# Patient Record
Sex: FEMALE | Race: WHITE | NOT HISPANIC OR LATINO
[De-identification: names, ages, dates, MRNs, and addresses within clinical notes are randomized per-mention and may not be internally consistent; named-entity substitution may affect disease eponyms.]

---

## 2019-08-23 PROBLEM — Z00.00 ENCOUNTER FOR PREVENTIVE HEALTH EXAMINATION: Status: ACTIVE | Noted: 2019-08-23

## 2019-12-19 ENCOUNTER — ASOB RESULT (OUTPATIENT)
Age: 29
End: 2019-12-19

## 2019-12-19 ENCOUNTER — APPOINTMENT (OUTPATIENT)
Dept: ANTEPARTUM | Facility: CLINIC | Age: 29
End: 2019-12-19
Payer: COMMERCIAL

## 2019-12-19 PROCEDURE — 76811 OB US DETAILED SNGL FETUS: CPT

## 2020-04-20 ENCOUNTER — INPATIENT (INPATIENT)
Facility: HOSPITAL | Age: 30
LOS: 3 days | Discharge: HOME | End: 2020-04-24
Attending: OBSTETRICS & GYNECOLOGY | Admitting: OBSTETRICS & GYNECOLOGY
Payer: COMMERCIAL

## 2020-04-20 VITALS
HEART RATE: 106 BPM | TEMPERATURE: 99 F | DIASTOLIC BLOOD PRESSURE: 71 MMHG | SYSTOLIC BLOOD PRESSURE: 112 MMHG | RESPIRATION RATE: 18 BRPM

## 2020-04-20 LAB
APPEARANCE UR: CLEAR — SIGNIFICANT CHANGE UP
BASOPHILS # BLD AUTO: 0.01 K/UL — SIGNIFICANT CHANGE UP (ref 0–0.2)
BASOPHILS NFR BLD AUTO: 0.1 % — SIGNIFICANT CHANGE UP (ref 0–1)
BILIRUB UR-MCNC: NEGATIVE — SIGNIFICANT CHANGE UP
BLD GP AB SCN SERPL QL: SIGNIFICANT CHANGE UP
COLOR SPEC: YELLOW — SIGNIFICANT CHANGE UP
DIFF PNL FLD: NEGATIVE — SIGNIFICANT CHANGE UP
EOSINOPHIL # BLD AUTO: 0.01 K/UL — SIGNIFICANT CHANGE UP (ref 0–0.7)
EOSINOPHIL NFR BLD AUTO: 0.1 % — SIGNIFICANT CHANGE UP (ref 0–8)
GLUCOSE UR QL: NEGATIVE — SIGNIFICANT CHANGE UP
HCT VFR BLD CALC: 37.9 % — SIGNIFICANT CHANGE UP (ref 37–47)
HGB BLD-MCNC: 13.1 G/DL — SIGNIFICANT CHANGE UP (ref 12–16)
IMM GRANULOCYTES NFR BLD AUTO: 0.4 % — HIGH (ref 0.1–0.3)
KETONES UR-MCNC: SIGNIFICANT CHANGE UP
LEUKOCYTE ESTERASE UR-ACNC: NEGATIVE — SIGNIFICANT CHANGE UP
LYMPHOCYTES # BLD AUTO: 0.86 K/UL — LOW (ref 1.2–3.4)
LYMPHOCYTES # BLD AUTO: 7.6 % — LOW (ref 20.5–51.1)
MCHC RBC-ENTMCNC: 32 PG — HIGH (ref 27–31)
MCHC RBC-ENTMCNC: 34.6 G/DL — SIGNIFICANT CHANGE UP (ref 32–37)
MCV RBC AUTO: 92.4 FL — SIGNIFICANT CHANGE UP (ref 81–99)
MONOCYTES # BLD AUTO: 0.52 K/UL — SIGNIFICANT CHANGE UP (ref 0.1–0.6)
MONOCYTES NFR BLD AUTO: 4.6 % — SIGNIFICANT CHANGE UP (ref 1.7–9.3)
NEUTROPHILS # BLD AUTO: 9.83 K/UL — HIGH (ref 1.4–6.5)
NEUTROPHILS NFR BLD AUTO: 87.2 % — HIGH (ref 42.2–75.2)
NITRITE UR-MCNC: NEGATIVE — SIGNIFICANT CHANGE UP
NRBC # BLD: 0 /100 WBCS — SIGNIFICANT CHANGE UP (ref 0–0)
PH UR: 6.5 — SIGNIFICANT CHANGE UP (ref 5–8)
PLATELET # BLD AUTO: 133 K/UL — SIGNIFICANT CHANGE UP (ref 130–400)
PRENATAL SYPHILIS TEST: SIGNIFICANT CHANGE UP
PROT UR-MCNC: SIGNIFICANT CHANGE UP
RBC # BLD: 4.1 M/UL — LOW (ref 4.2–5.4)
RBC # FLD: 13.4 % — SIGNIFICANT CHANGE UP (ref 11.5–14.5)
SP GR SPEC: 1.02 — SIGNIFICANT CHANGE UP (ref 1.01–1.02)
UROBILINOGEN FLD QL: SIGNIFICANT CHANGE UP
WBC # BLD: 11.28 K/UL — HIGH (ref 4.8–10.8)
WBC # FLD AUTO: 11.28 K/UL — HIGH (ref 4.8–10.8)

## 2020-04-20 RX ORDER — OXYTOCIN 10 UNIT/ML
333.33 VIAL (ML) INJECTION
Qty: 20 | Refills: 0 | Status: DISCONTINUED | OUTPATIENT
Start: 2020-04-20 | End: 2020-04-24

## 2020-04-20 RX ORDER — SODIUM CHLORIDE 9 MG/ML
1000 INJECTION, SOLUTION INTRAVENOUS
Refills: 0 | Status: DISCONTINUED | OUTPATIENT
Start: 2020-04-20 | End: 2020-04-22

## 2020-04-20 NOTE — OB PROVIDER H&P - NSHPLABSRESULTS_GEN_ALL_CORE
Labs:  8/22  measles nonimmune  mumps immune  varicella immune    1/21  GCT 95    Sono:  12/19: 24w3d, post placenta, MVP normal, AGA< normal anatomy   3/17: 37w1d, BPP 8/8, MVP 4.cm  4/1: 39w2d, vtx, MVP 5.92cm, BPP 8/8  4/7: 40w1d, MVP 3.8cm  4/13: 40w5d, vtx, MVP 6.3cm

## 2020-04-20 NOTE — OB PROVIDER H&P - ASSESSMENT
30yo  @42w0d, GBS neg, h/o previous LTCS for arrest of dilation, for TOLAC, in labor  -admit to L&D  -cont efm/toco  -clear liquid diet, IVF  -epidural for pain management  -f/u admission labs    Dr. Ramon and Dr. Pereira aware.

## 2020-04-20 NOTE — OB PROVIDER H&P - NS_OBGYNHISTORY_OBGYN_ALL_OB_FT
Ob: FT LTCS x1, arrest of dilation at 2cm, 7-3, no complications     Gyn: Denies fibroids, cysts, abnormal pap smears, STDs.

## 2020-04-20 NOTE — OB RN TRIAGE NOTE - CHIEF COMPLAINT QUOTE
Patient states she has been having ctx's since 3pm yesterday. Denies ROM, states she has mild bloody discharge.

## 2020-04-20 NOTE — PROCEDURE NOTE - ADDITIONAL PROCEDURE DETAILS
Called to evaluate pt. c/o labor pain.  Epidural catheter noted to be disconnected from infusion luer connection.  Reattached catheter to luer connection using aseptic technique.  Fentanyl 100 mcg with 8 ml MPF bupivicaine 0.25% administered via indwelling epidural catheter at 0720. Called to evaluate pt. c/o labor pain.  Epidural catheter noted to be disconnected from infusion luer connection.  Reattached catheter to luer connection using aseptic technique.  Fentanyl 100 mcg with 8 ml MPF bupivicaine 0.25% administered via indwelling epidural catheter at 0720.    REDOSE @ 1030  Pain: 7/10  V/E 9cm  Medication: 2% Chloroprocaine 8cc  Given in increments after aspiration  VSS analgesia at T10 Called to evaluate pt. c/o labor pain.  Epidural catheter noted to be disconnected from infusion luer connection.  Reattached catheter to luer connection using aseptic technique.  Fentanyl 100 mcg with 8 ml MPF bupivicaine 0.25% administered via indwelling epidural catheter at 0720.    REDOSE @ 1030  Pain: 7/10  V/E 9cm  Medication: 2% Chloroprocaine 8cc  Given in increments after aspiration  VSS analgesia at T10    Patient transferred to OR for C/S at 1513  Indication for C/S; arrest of dilatation at 9cm

## 2020-04-20 NOTE — OB PROVIDER H&P - HISTORY OF PRESENT ILLNESS
28yo  @42w0d with LUCY  by LMP  consistent with first trimester sonogram presenting to L&D for 28yo  @42w0d with LUCY 6 by LMP  consistent with first trimester sonogram presenting to L&D for contractions since yesterday, now q3m.  Had an exam in the office this AM, 2cm dilated.  Denies VB, LOF.  Good FM.  Denies any complications with this pregnancy.  History of full term LTCS for arrest of dilation at 2cm, baby was 7lb3oz.  GBS neg.

## 2020-04-20 NOTE — OB PROVIDER H&P - NSHPPHYSICALEXAM_GEN_ALL_CORE
Vital Signs Last 24 Hrs  T(C): 37.2 (20 Apr 2020 19:34), Max: 37.2 (20 Apr 2020 19:18)  T(F): 98.9 (20 Apr 2020 19:34), Max: 98.9 (20 Apr 2020 19:18)  HR: 106 (20 Apr 2020 19:34) (106 - 106) Bedside manual pulse 88bpm  BP: 112/71 (20 Apr 2020 19:34) (112/71 - 112/71)  RR: 18 (20 Apr 2020 19:34) (18 - 18)    Gen: NAD, sitting comfortably  Abd: Gravid, soft, NT, strongly palpable ctx  SVE: 5/100/-1 ,vtx, intact per Dr. Ramon  EFM: 155/mod/+accels, cat I  [place]: Q3m

## 2020-04-21 ENCOUNTER — RESULT REVIEW (OUTPATIENT)
Age: 30
End: 2020-04-21

## 2020-04-21 LAB
ABO RH CONFIRMATION: SIGNIFICANT CHANGE UP
APTT BLD: 32.9 SEC — SIGNIFICANT CHANGE UP (ref 27–39.2)
BASOPHILS # BLD AUTO: 0.01 K/UL — SIGNIFICANT CHANGE UP (ref 0–0.2)
BASOPHILS NFR BLD AUTO: 0.1 % — SIGNIFICANT CHANGE UP (ref 0–1)
CREAT ?TM UR-MCNC: 22 MG/DL — SIGNIFICANT CHANGE UP
EOSINOPHIL # BLD AUTO: 0 K/UL — SIGNIFICANT CHANGE UP (ref 0–0.7)
EOSINOPHIL NFR BLD AUTO: 0 % — SIGNIFICANT CHANGE UP (ref 0–8)
FIBRINOGEN PPP-MCNC: >700 MG/DL — HIGH (ref 204.4–570.6)
HCT VFR BLD CALC: 33.8 % — LOW (ref 37–47)
HGB BLD-MCNC: 11.7 G/DL — LOW (ref 12–16)
IMM GRANULOCYTES NFR BLD AUTO: 0.8 % — HIGH (ref 0.1–0.3)
INR BLD: 0.95 RATIO — SIGNIFICANT CHANGE UP (ref 0.65–1.3)
LYMPHOCYTES # BLD AUTO: 0.77 K/UL — LOW (ref 1.2–3.4)
LYMPHOCYTES # BLD AUTO: 5.4 % — LOW (ref 20.5–51.1)
MCHC RBC-ENTMCNC: 33 PG — HIGH (ref 27–31)
MCHC RBC-ENTMCNC: 34.6 G/DL — SIGNIFICANT CHANGE UP (ref 32–37)
MCV RBC AUTO: 95.2 FL — SIGNIFICANT CHANGE UP (ref 81–99)
MONOCYTES # BLD AUTO: 0.95 K/UL — HIGH (ref 0.1–0.6)
MONOCYTES NFR BLD AUTO: 6.7 % — SIGNIFICANT CHANGE UP (ref 1.7–9.3)
NEUTROPHILS # BLD AUTO: 12.32 K/UL — HIGH (ref 1.4–6.5)
NEUTROPHILS NFR BLD AUTO: 87 % — HIGH (ref 42.2–75.2)
NRBC # BLD: 0 /100 WBCS — SIGNIFICANT CHANGE UP (ref 0–0)
PLATELET # BLD AUTO: 106 K/UL — LOW (ref 130–400)
PROT ?TM UR-MCNC: 32 MG/DLG/24H — SIGNIFICANT CHANGE UP
PROT/CREAT UR-RTO: 1.5 RATIO — HIGH (ref 0–0.2)
PROTHROM AB SERPL-ACNC: 10.9 SEC — SIGNIFICANT CHANGE UP (ref 9.95–12.87)
RBC # BLD: 3.55 M/UL — LOW (ref 4.2–5.4)
RBC # FLD: 13.8 % — SIGNIFICANT CHANGE UP (ref 11.5–14.5)
WBC # BLD: 14.17 K/UL — HIGH (ref 4.8–10.8)
WBC # FLD AUTO: 14.17 K/UL — HIGH (ref 4.8–10.8)

## 2020-04-21 PROCEDURE — 59510 CESAREAN DELIVERY: CPT | Mod: 22

## 2020-04-21 PROCEDURE — 88307 TISSUE EXAM BY PATHOLOGIST: CPT | Mod: 26

## 2020-04-21 RX ORDER — METOCLOPRAMIDE HCL 10 MG
10 TABLET ORAL ONCE
Refills: 0 | Status: COMPLETED | OUTPATIENT
Start: 2020-04-21 | End: 2020-04-21

## 2020-04-21 RX ORDER — SIMETHICONE 80 MG/1
80 TABLET, CHEWABLE ORAL EVERY 4 HOURS
Refills: 0 | Status: DISCONTINUED | OUTPATIENT
Start: 2020-04-21 | End: 2020-04-24

## 2020-04-21 RX ORDER — SODIUM CHLORIDE 9 MG/ML
1000 INJECTION, SOLUTION INTRAVENOUS ONCE
Refills: 0 | Status: DISCONTINUED | OUTPATIENT
Start: 2020-04-21 | End: 2020-04-22

## 2020-04-21 RX ORDER — CITRIC ACID/SODIUM CITRATE 300-500 MG
30 SOLUTION, ORAL ORAL ONCE
Refills: 0 | Status: COMPLETED | OUTPATIENT
Start: 2020-04-21 | End: 2020-04-21

## 2020-04-21 RX ORDER — AZITHROMYCIN 500 MG/1
500 TABLET, FILM COATED ORAL ONCE
Refills: 0 | Status: COMPLETED | OUTPATIENT
Start: 2020-04-21 | End: 2020-04-21

## 2020-04-21 RX ORDER — SODIUM CHLORIDE 9 MG/ML
1000 INJECTION, SOLUTION INTRAVENOUS
Refills: 0 | Status: DISCONTINUED | OUTPATIENT
Start: 2020-04-21 | End: 2020-04-22

## 2020-04-21 RX ORDER — OXYTOCIN 10 UNIT/ML
2 VIAL (ML) INJECTION
Qty: 30 | Refills: 0 | Status: DISCONTINUED | OUTPATIENT
Start: 2020-04-21 | End: 2020-04-21

## 2020-04-21 RX ORDER — FAMOTIDINE 10 MG/ML
20 INJECTION INTRAVENOUS ONCE
Refills: 0 | Status: COMPLETED | OUTPATIENT
Start: 2020-04-21 | End: 2020-04-21

## 2020-04-21 RX ORDER — CEFAZOLIN SODIUM 1 G
2000 VIAL (EA) INJECTION EVERY 8 HOURS
Refills: 0 | Status: COMPLETED | OUTPATIENT
Start: 2020-04-22 | End: 2020-04-22

## 2020-04-21 RX ORDER — LANOLIN
1 OINTMENT (GRAM) TOPICAL EVERY 6 HOURS
Refills: 0 | Status: DISCONTINUED | OUTPATIENT
Start: 2020-04-21 | End: 2020-04-24

## 2020-04-21 RX ORDER — SODIUM CHLORIDE 9 MG/ML
1000 INJECTION, SOLUTION INTRAVENOUS
Refills: 0 | Status: DISCONTINUED | OUTPATIENT
Start: 2020-04-21 | End: 2020-04-23

## 2020-04-21 RX ORDER — KETOROLAC TROMETHAMINE 30 MG/ML
30 SYRINGE (ML) INJECTION EVERY 6 HOURS
Refills: 0 | Status: DISCONTINUED | OUTPATIENT
Start: 2020-04-22 | End: 2020-04-22

## 2020-04-21 RX ORDER — OXYTOCIN 10 UNIT/ML
333.33 VIAL (ML) INJECTION
Qty: 20 | Refills: 0 | Status: DISCONTINUED | OUTPATIENT
Start: 2020-04-21 | End: 2020-04-24

## 2020-04-21 RX ORDER — ACETAMINOPHEN 500 MG
1000 TABLET ORAL ONCE
Refills: 0 | Status: DISCONTINUED | OUTPATIENT
Start: 2020-04-21 | End: 2020-04-24

## 2020-04-21 RX ORDER — GLYCERIN ADULT
1 SUPPOSITORY, RECTAL RECTAL AT BEDTIME
Refills: 0 | Status: DISCONTINUED | OUTPATIENT
Start: 2020-04-21 | End: 2020-04-24

## 2020-04-21 RX ORDER — ONDANSETRON 8 MG/1
4 TABLET, FILM COATED ORAL EVERY 6 HOURS
Refills: 0 | Status: DISCONTINUED | OUTPATIENT
Start: 2020-04-21 | End: 2020-04-24

## 2020-04-21 RX ORDER — IBUPROFEN 200 MG
600 TABLET ORAL EVERY 6 HOURS
Refills: 0 | Status: DISCONTINUED | OUTPATIENT
Start: 2020-04-21 | End: 2020-04-23

## 2020-04-21 RX ORDER — DIPHENHYDRAMINE HCL 50 MG
25 CAPSULE ORAL EVERY 6 HOURS
Refills: 0 | Status: DISCONTINUED | OUTPATIENT
Start: 2020-04-21 | End: 2020-04-24

## 2020-04-21 RX ORDER — MORPHINE SULFATE 50 MG/1
2 CAPSULE, EXTENDED RELEASE ORAL ONCE
Refills: 0 | Status: DISCONTINUED | OUTPATIENT
Start: 2020-04-21 | End: 2020-04-24

## 2020-04-21 RX ORDER — OXYCODONE HYDROCHLORIDE 5 MG/1
5 TABLET ORAL
Refills: 0 | Status: DISCONTINUED | OUTPATIENT
Start: 2020-04-21 | End: 2020-04-24

## 2020-04-21 RX ORDER — CEFAZOLIN SODIUM 1 G
2000 VIAL (EA) INJECTION ONCE
Refills: 0 | Status: COMPLETED | OUTPATIENT
Start: 2020-04-21 | End: 2020-04-21

## 2020-04-21 RX ORDER — MAGNESIUM HYDROXIDE 400 MG/1
30 TABLET, CHEWABLE ORAL
Refills: 0 | Status: DISCONTINUED | OUTPATIENT
Start: 2020-04-21 | End: 2020-04-24

## 2020-04-21 RX ORDER — DEXAMETHASONE 0.5 MG/5ML
4 ELIXIR ORAL EVERY 6 HOURS
Refills: 0 | Status: DISCONTINUED | OUTPATIENT
Start: 2020-04-21 | End: 2020-04-24

## 2020-04-21 RX ORDER — NALOXONE HYDROCHLORIDE 4 MG/.1ML
0.1 SPRAY NASAL
Refills: 0 | Status: DISCONTINUED | OUTPATIENT
Start: 2020-04-21 | End: 2020-04-24

## 2020-04-21 RX ORDER — ACETAMINOPHEN 500 MG
975 TABLET ORAL
Refills: 0 | Status: DISCONTINUED | OUTPATIENT
Start: 2020-04-21 | End: 2020-04-24

## 2020-04-21 RX ORDER — HYDROMORPHONE HYDROCHLORIDE 2 MG/ML
0.5 INJECTION INTRAMUSCULAR; INTRAVENOUS; SUBCUTANEOUS
Refills: 0 | Status: DISCONTINUED | OUTPATIENT
Start: 2020-04-21 | End: 2020-04-24

## 2020-04-21 RX ORDER — OXYTOCIN 10 UNIT/ML
1 VIAL (ML) INJECTION
Qty: 30 | Refills: 0 | Status: DISCONTINUED | OUTPATIENT
Start: 2020-04-21 | End: 2020-04-21

## 2020-04-21 RX ORDER — OXYCODONE HYDROCHLORIDE 5 MG/1
5 TABLET ORAL ONCE
Refills: 0 | Status: DISCONTINUED | OUTPATIENT
Start: 2020-04-21 | End: 2020-04-24

## 2020-04-21 RX ADMIN — SIMETHICONE 80 MILLIGRAM(S): 80 TABLET, CHEWABLE ORAL at 22:33

## 2020-04-21 RX ADMIN — AZITHROMYCIN 255 MILLIGRAM(S): 500 TABLET, FILM COATED ORAL at 14:48

## 2020-04-21 RX ADMIN — FAMOTIDINE 20 MILLIGRAM(S): 10 INJECTION INTRAVENOUS at 14:49

## 2020-04-21 RX ADMIN — Medication 30 MILLILITER(S): at 14:50

## 2020-04-21 RX ADMIN — Medication 10 MILLIGRAM(S): at 14:49

## 2020-04-21 RX ADMIN — Medication 5 MILLIGRAM(S): at 22:32

## 2020-04-21 RX ADMIN — Medication 100 MILLIGRAM(S): at 14:48

## 2020-04-21 NOTE — PROGRESS NOTE ADULT - SUBJECTIVE AND OBJECTIVE BOX
PGY3 progress note    Patient seen at bedside, resting comfortably. No complaints.    Vital Signs Last 24 Hrs  T(C): 37.2 (2020 19:34), Max: 37.2 (2020 19:18)  T(F): 98.9 (2020 19:34), Max: 98.9 (2020 19:18)  HR: 105 (2020 00:16) (66 - 122)  BP: 88/54 (2020 00:11) (76/42 - 125/83)  BP(mean): --  RR: 18 (2020 19:34) (18 - 18)  SpO2: 96% (2020 00:13) (87% - 99%)    EFM: 140/mod/accels+  TOCO: q2-4 min.  SVE: deferred, last exam @ was 4-8/80/-1, cephalic, AROM, light meconium (Dr. farias)    Medications:  2030 epidural      Labs:                        13.1   11.28 )-----------( 133      ( 2020 20:11 )             37.9           ABO RH Interpretation: A POS (20 @ 20:11)    Urinalysis Basic - ( 2020 21:04 )    Color: Yellow / Appearance: Clear / S.023 / pH: x  Gluc: x / Ketone: Trace  / Bili: Negative / Urobili: <2 mg/dL   Blood: x / Protein: Trace / Nitrite: Negative   Leuk Esterase: Negative / RBC: x / WBC x   Sq Epi: x / Non Sq Epi: x / Bacteria: x    RPR NR  UDS collected after epidural so not sent

## 2020-04-21 NOTE — PRE-ANESTHESIA EVALUATION ADULT - NSANTHOSAYNRD_GEN_A_CORE
No. DIXIE screening performed.  STOP BANG Legend: 0-2 = LOW Risk; 3-4 = INTERMEDIATE Risk; 5-8 = HIGH Risk

## 2020-04-21 NOTE — PROGRESS NOTE ADULT - ASSESSMENT
30 y/o P2 s/p repeat  #2 for arrest of descent complicated by uterine rupture and cervical extension, EBL 2000cc, s/p 2 units PRBCs, POD0, r/o gHTN vs preeclampsia, doing well.   - post transfusion CBC and preeclamptic labs ordered 2330  - f/u vital signs  - SCDs for DVT/PE prophylaxis  - clear liquid diet, will advance diet if Hgb stable  - ancef x 24 hours  - f/u UO, remove gonzalez in AM if UO adequate    Dr. Ramon aware.

## 2020-04-21 NOTE — PROGRESS NOTE ADULT - ASSESSMENT
30 y/o  at 42w1d GA, GBS negative, TOLAC, in early labor.   - continuous EFM and toco  - pain management PRN  - clear liquids  - continue with current management    Dr. Nuñez at bedside.

## 2020-04-21 NOTE — BRIEF OPERATIVE NOTE - NSICDXBRIEFPREOP_GEN_ALL_CORE_FT
PRE-OP DIAGNOSIS:  Failed trial of labor following previous , unspecified, unspecified as to episode of care, or not applicable 2020 18:58:38 30yo  at 42w1d GA, with arrest of descent Pedrito Jimenez

## 2020-04-21 NOTE — PROGRESS NOTE ADULT - ASSESSMENT
30 y/o  at 42w0d GA, GBS negative, TOLAC, in early labor.   - continuous EFM and toco  - pain management PRN  - clear liquids  - continue with current management    Dr. Nuñez aware.

## 2020-04-21 NOTE — PROGRESS NOTE ADULT - SUBJECTIVE AND OBJECTIVE BOX
Ob Attending Progress    Patient seen at bedside for evaluation of labor progression.  Reports no pain. Comfortable s/p epidural.    T(F): 98.6 (01:20)  HR: 110 (02:19)  BP: 71/49 (02:13)  RR: 18 (01:20)  EFM: Baseline 144 accels occ variable type decel noted, overall category 2   TOCO: Ctx q2-3 min  SVE: 6-7 cm/90%/-1 station, adequate pelvis. Lt meconium stained fluid noted.     Labs:                        13.1   11.28 )-----------( 133      ( 2020 20:11 )             37.9       ABO RH Interpretation: A POS (20 @ 20:11)    Urinalysis Basic - ( 2020 21:04 )    Color: Yellow / Appearance: Clear / S.023 / pH: x  Gluc: x / Ketone: Trace  / Bili: Negative / Urobili: <2 mg/dL   Blood: x / Protein: Trace / Nitrite: Negative   Leuk Esterase: Negative / RBC: x / WBC x   Sq Epi: x / Non Sq Epi: x / Bacteria: x          Meds: lactated ringers. 1000 milliLiter(s) IV Continuous <Continuous>  oxytocin Infusion 333.333 milliUNIT(s)/Min IV Continuous <Continuous>      A/P:  30y/o , at 42 wks, previous c/s for failed induction, in labor now undergoing TOLAC. Gbs cx neg. Contraction pattern adequate.   - continue current management  - follow Fhr pattern   -re-evaluate 2-3 hrs prn

## 2020-04-21 NOTE — PROGRESS NOTE ADULT - SUBJECTIVE AND OBJECTIVE BOX
Chief Complaint: Postpartum    HPI: Pt doing well, pain well controlled. Minimal vaginal bleeding. Not ambulating yet. Tolerating clear liquids. No flatus or bowel movements yet. [place] in place with clear urine.     ROS: Denies cardiovascular or respiratory symptoms    PAST MEDICAL & SURGICAL HISTORY:  No pertinent past medical history   delivery delivered      Physical Exam  Vital Signs Last 24 Hrs  T(F): 98.96 (2020 11:01), Max: 99.32 (2020 02:43)  HR: 66 (2020 21:27) (59 - 128)  BP: 133/70 (2020 21:16) (71/49 - 173/84)  RR: 18 (2020 20:00) (18 - 18)    UO 5079-8561 300cc    Physical exam:  General - AAOx3, NAD  Heart - S1S2, RRR  Lungs - CTA BL  Abdomen:  - Soft, nontender, nondistended, no bowel sounds  - Fundus firm, nontender, at the umbilicus  - dressing over incision c/d/i  Pelvis/Vagina - Normal Lochia  Extremities: No calf tenderness, no swelling, SCDs in place    Labs:                        11.7   14.17 )-----------( 106      ( 2020 17:20 )             33.8                         13.1   11.28 )-----------( 133      ( 2020 20:11 )             37.9     Antibody Screen: NEG (20 @ 20:11)    Upr/cr pending

## 2020-04-21 NOTE — PROGRESS NOTE ADULT - SUBJECTIVE AND OBJECTIVE BOX
PGY3 progress note    Patient seen and examined at bedside for complaints of pelvic pressure.     Vital Signs Last 24 Hrs  T(C): 37.2 (2020 05:17), Max: 37.4 (2020 02:43)  T(F): 98.96 (2020 05:17), Max: 99.32 (2020 02:43)  HR: 79 (2020 06:04) (66 - 122)  BP: 108/60 (2020 05:57) (71/49 - 125/83)  BP(mean): --  RR: 18 (2020 05:17) (18 - 18)  SpO2: 98% (2020 05:59) (87% - 99%)    EFM: 145/mod/accels+  TOCO: q2-3 min.  SVE: 8-9/80/-1, cephalic, ruptured, light meconium (Dr. Nuñez)    Medications:  2005 epidural      Labs:                        13.1   11.28 )-----------( 133      ( 2020 20:11 )             37.9           ABO RH Interpretation: A POS (20 @ 20:11)    Urinalysis Basic - ( 2020 21:04 )    Color: Yellow / Appearance: Clear / S.023 / pH: x  Gluc: x / Ketone: Trace  / Bili: Negative / Urobili: <2 mg/dL   Blood: x / Protein: Trace / Nitrite: Negative   Leuk Esterase: Negative / RBC: x / WBC x   Sq Epi: x / Non Sq Epi: x / Bacteria: x

## 2020-04-21 NOTE — BRIEF OPERATIVE NOTE - OPERATION/FINDINGS
live male infant weighing 8lb (3640g) with APGARs 9/9 delivered in vertex presentation. Uterine rupture approx 1cm noted in lower uterine segment.   Vertical extension from the midline of uterine incision to the cervix.    Normal appearing b/l tubes/ovaries.   1U pRBC given intra-op live male infant weighing 8lb (3640g) with APGARs 9/9 delivered in vertex presentation. Uterine rupture approx 1cm noted in lower uterine segment.   Vertical extension from the midline of uterine incision to the cervix.    bladder retrograde filled with 700cc normal saline, no leakage noted.   Normal appearing b/l tubes/ovaries.   1U pRBC given intra-op

## 2020-04-21 NOTE — BRIEF OPERATIVE NOTE - NSICDXBRIEFPROCEDURE_GEN_ALL_CORE_FT
PROCEDURES:  Repeat low transverse  section 2020 18:56:47 with repair of uterine/cervical extension Pedrito Jimenez

## 2020-04-22 ENCOUNTER — TRANSCRIPTION ENCOUNTER (OUTPATIENT)
Age: 30
End: 2020-04-22

## 2020-04-22 LAB
ALBUMIN SERPL ELPH-MCNC: 2.4 G/DL — LOW (ref 3.5–5.2)
ALBUMIN SERPL ELPH-MCNC: 2.7 G/DL — LOW (ref 3.5–5.2)
ALP SERPL-CCNC: 137 U/L — HIGH (ref 30–115)
ALP SERPL-CCNC: 150 U/L — HIGH (ref 30–115)
ALT FLD-CCNC: 17 U/L — SIGNIFICANT CHANGE UP (ref 0–41)
ALT FLD-CCNC: 19 U/L — SIGNIFICANT CHANGE UP (ref 0–41)
ANION GAP SERPL CALC-SCNC: 10 MMOL/L — SIGNIFICANT CHANGE UP (ref 7–14)
ANION GAP SERPL CALC-SCNC: 11 MMOL/L — SIGNIFICANT CHANGE UP (ref 7–14)
APTT BLD: 30.4 SEC — SIGNIFICANT CHANGE UP (ref 27–39.2)
AST SERPL-CCNC: 51 U/L — HIGH (ref 0–41)
AST SERPL-CCNC: 59 U/L — HIGH (ref 0–41)
BASOPHILS # BLD AUTO: 0.01 K/UL — SIGNIFICANT CHANGE UP (ref 0–0.2)
BASOPHILS # BLD AUTO: 0.02 K/UL — SIGNIFICANT CHANGE UP (ref 0–0.2)
BASOPHILS NFR BLD AUTO: 0.1 % — SIGNIFICANT CHANGE UP (ref 0–1)
BASOPHILS NFR BLD AUTO: 0.2 % — SIGNIFICANT CHANGE UP (ref 0–1)
BILIRUB SERPL-MCNC: 0.4 MG/DL — SIGNIFICANT CHANGE UP (ref 0.2–1.2)
BILIRUB SERPL-MCNC: 0.7 MG/DL — SIGNIFICANT CHANGE UP (ref 0.2–1.2)
BUN SERPL-MCNC: 13 MG/DL — SIGNIFICANT CHANGE UP (ref 10–20)
BUN SERPL-MCNC: 14 MG/DL — SIGNIFICANT CHANGE UP (ref 10–20)
CALCIUM SERPL-MCNC: 7.9 MG/DL — LOW (ref 8.5–10.1)
CALCIUM SERPL-MCNC: 8.1 MG/DL — LOW (ref 8.5–10.1)
CHLORIDE SERPL-SCNC: 106 MMOL/L — SIGNIFICANT CHANGE UP (ref 98–110)
CHLORIDE SERPL-SCNC: 109 MMOL/L — SIGNIFICANT CHANGE UP (ref 98–110)
CO2 SERPL-SCNC: 22 MMOL/L — SIGNIFICANT CHANGE UP (ref 17–32)
CO2 SERPL-SCNC: 23 MMOL/L — SIGNIFICANT CHANGE UP (ref 17–32)
CREAT SERPL-MCNC: 1.1 MG/DL — SIGNIFICANT CHANGE UP (ref 0.7–1.5)
CREAT SERPL-MCNC: 1.1 MG/DL — SIGNIFICANT CHANGE UP (ref 0.7–1.5)
EOSINOPHIL # BLD AUTO: 0.01 K/UL — SIGNIFICANT CHANGE UP (ref 0–0.7)
EOSINOPHIL # BLD AUTO: 0.02 K/UL — SIGNIFICANT CHANGE UP (ref 0–0.7)
EOSINOPHIL NFR BLD AUTO: 0.1 % — SIGNIFICANT CHANGE UP (ref 0–8)
EOSINOPHIL NFR BLD AUTO: 0.2 % — SIGNIFICANT CHANGE UP (ref 0–8)
FIBRINOGEN PPP-MCNC: >700 MG/DL — HIGH (ref 204.4–570.6)
GLUCOSE SERPL-MCNC: 76 MG/DL — SIGNIFICANT CHANGE UP (ref 70–99)
GLUCOSE SERPL-MCNC: 81 MG/DL — SIGNIFICANT CHANGE UP (ref 70–99)
HCT VFR BLD CALC: 33.6 % — LOW (ref 37–47)
HCT VFR BLD CALC: 35 % — LOW (ref 37–47)
HGB BLD-MCNC: 11.5 G/DL — LOW (ref 12–16)
HGB BLD-MCNC: 12 G/DL — SIGNIFICANT CHANGE UP (ref 12–16)
IMM GRANULOCYTES NFR BLD AUTO: 0.6 % — HIGH (ref 0.1–0.3)
IMM GRANULOCYTES NFR BLD AUTO: 0.6 % — HIGH (ref 0.1–0.3)
INR BLD: 1.05 RATIO — SIGNIFICANT CHANGE UP (ref 0.65–1.3)
LDH SERPL L TO P-CCNC: 311 — HIGH (ref 50–242)
LDH SERPL L TO P-CCNC: 345 — HIGH (ref 50–242)
LYMPHOCYTES # BLD AUTO: 0.98 K/UL — LOW (ref 1.2–3.4)
LYMPHOCYTES # BLD AUTO: 1.07 K/UL — LOW (ref 1.2–3.4)
LYMPHOCYTES # BLD AUTO: 7.7 % — LOW (ref 20.5–51.1)
LYMPHOCYTES # BLD AUTO: 8.6 % — LOW (ref 20.5–51.1)
MCHC RBC-ENTMCNC: 31.7 PG — HIGH (ref 27–31)
MCHC RBC-ENTMCNC: 31.9 PG — HIGH (ref 27–31)
MCHC RBC-ENTMCNC: 34.2 G/DL — SIGNIFICANT CHANGE UP (ref 32–37)
MCHC RBC-ENTMCNC: 34.3 G/DL — SIGNIFICANT CHANGE UP (ref 32–37)
MCV RBC AUTO: 92.6 FL — SIGNIFICANT CHANGE UP (ref 81–99)
MCV RBC AUTO: 93.1 FL — SIGNIFICANT CHANGE UP (ref 81–99)
MONOCYTES # BLD AUTO: 0.67 K/UL — HIGH (ref 0.1–0.6)
MONOCYTES # BLD AUTO: 0.81 K/UL — HIGH (ref 0.1–0.6)
MONOCYTES NFR BLD AUTO: 5.3 % — SIGNIFICANT CHANGE UP (ref 1.7–9.3)
MONOCYTES NFR BLD AUTO: 6.5 % — SIGNIFICANT CHANGE UP (ref 1.7–9.3)
NEUTROPHILS # BLD AUTO: 10.39 K/UL — HIGH (ref 1.4–6.5)
NEUTROPHILS # BLD AUTO: 10.91 K/UL — HIGH (ref 1.4–6.5)
NEUTROPHILS NFR BLD AUTO: 84 % — HIGH (ref 42.2–75.2)
NEUTROPHILS NFR BLD AUTO: 86.1 % — HIGH (ref 42.2–75.2)
NRBC # BLD: 0 /100 WBCS — SIGNIFICANT CHANGE UP (ref 0–0)
NRBC # BLD: 0 /100 WBCS — SIGNIFICANT CHANGE UP (ref 0–0)
PLATELET # BLD AUTO: 100 K/UL — LOW (ref 130–400)
PLATELET # BLD AUTO: 96 K/UL — LOW (ref 130–400)
POTASSIUM SERPL-MCNC: 4.2 MMOL/L — SIGNIFICANT CHANGE UP (ref 3.5–5)
POTASSIUM SERPL-MCNC: 4.4 MMOL/L — SIGNIFICANT CHANGE UP (ref 3.5–5)
POTASSIUM SERPL-SCNC: 4.2 MMOL/L — SIGNIFICANT CHANGE UP (ref 3.5–5)
POTASSIUM SERPL-SCNC: 4.4 MMOL/L — SIGNIFICANT CHANGE UP (ref 3.5–5)
PROT SERPL-MCNC: 4.4 G/DL — LOW (ref 6–8)
PROT SERPL-MCNC: 4.7 G/DL — LOW (ref 6–8)
PROTHROM AB SERPL-ACNC: 12.1 SEC — SIGNIFICANT CHANGE UP (ref 9.95–12.87)
RBC # BLD: 3.63 M/UL — LOW (ref 4.2–5.4)
RBC # BLD: 3.76 M/UL — LOW (ref 4.2–5.4)
RBC # FLD: 15.2 % — HIGH (ref 11.5–14.5)
RBC # FLD: 15.6 % — HIGH (ref 11.5–14.5)
SODIUM SERPL-SCNC: 140 MMOL/L — SIGNIFICANT CHANGE UP (ref 135–146)
SODIUM SERPL-SCNC: 141 MMOL/L — SIGNIFICANT CHANGE UP (ref 135–146)
URATE SERPL-MCNC: 4.1 MG/DL — SIGNIFICANT CHANGE UP (ref 2.5–7)
URATE SERPL-MCNC: 4.5 MG/DL — SIGNIFICANT CHANGE UP (ref 2.5–7)
WBC # BLD: 12.37 K/UL — HIGH (ref 4.8–10.8)
WBC # BLD: 12.66 K/UL — HIGH (ref 4.8–10.8)
WBC # FLD AUTO: 12.37 K/UL — HIGH (ref 4.8–10.8)
WBC # FLD AUTO: 12.66 K/UL — HIGH (ref 4.8–10.8)

## 2020-04-22 RX ADMIN — Medication 975 MILLIGRAM(S): at 15:05

## 2020-04-22 RX ADMIN — SIMETHICONE 80 MILLIGRAM(S): 80 TABLET, CHEWABLE ORAL at 21:10

## 2020-04-22 RX ADMIN — Medication 100 MILLIGRAM(S): at 18:38

## 2020-04-22 RX ADMIN — SIMETHICONE 80 MILLIGRAM(S): 80 TABLET, CHEWABLE ORAL at 02:39

## 2020-04-22 RX ADMIN — Medication 100 MILLIGRAM(S): at 01:55

## 2020-04-22 RX ADMIN — Medication 30 MILLIGRAM(S): at 13:16

## 2020-04-22 RX ADMIN — SIMETHICONE 80 MILLIGRAM(S): 80 TABLET, CHEWABLE ORAL at 11:17

## 2020-04-22 RX ADMIN — Medication 5 MILLIGRAM(S): at 21:10

## 2020-04-22 RX ADMIN — Medication 30 MILLIGRAM(S): at 01:55

## 2020-04-22 RX ADMIN — Medication 30 MILLIGRAM(S): at 19:19

## 2020-04-22 RX ADMIN — SIMETHICONE 80 MILLIGRAM(S): 80 TABLET, CHEWABLE ORAL at 06:29

## 2020-04-22 RX ADMIN — Medication 100 MILLIGRAM(S): at 11:17

## 2020-04-22 RX ADMIN — Medication 975 MILLIGRAM(S): at 02:39

## 2020-04-22 RX ADMIN — SIMETHICONE 80 MILLIGRAM(S): 80 TABLET, CHEWABLE ORAL at 18:37

## 2020-04-22 RX ADMIN — Medication 975 MILLIGRAM(S): at 21:10

## 2020-04-22 RX ADMIN — Medication 30 MILLIGRAM(S): at 06:29

## 2020-04-22 RX ADMIN — Medication 975 MILLIGRAM(S): at 08:23

## 2020-04-22 NOTE — PROGRESS NOTE ADULT - ASSESSMENT
28 yo now P2, s/p repeat  section w/ uterine rupture and cervical extension, POD1, EBL 2000cc, s/p 2U PRBCs, isolated elevated BPs, recovering well.    - c/w gonzalez until CBC results  - pain management PRN  - c/w clear liquid diet, advance later if stable  - IV hydration  - ancef x24hrs  - MMR ordered  - encourage ambulation, incentive spirometry  - monitor vitals, bleeding  - AM CBC and PELs drawn     Dr. Bo aware and Dr. Ga to be made aware.

## 2020-04-22 NOTE — DISCHARGE NOTE OB - PATIENT PORTAL LINK FT
You can access the FollowMyHealth Patient Portal offered by [facility] by registering at the following website: [email or web address]. By joining [employer]’s FollowMyHealth portal, you will also be able to view your health information using other applications (apps) compatible with our system.

## 2020-04-22 NOTE — DISCHARGE NOTE OB - HOSPITAL COURSE
Pt was failed TOLAC and required repeat  section for arrest of descent. Intraop course complicated by cervical extension repaired vaginally. Pt instructed to avoid labor/ctx in future pregnancies and to schedule c/s between 36-38w GA. Total EBL 2000cc, s/p 2U pRBC. Postop, H/H stable, pt voiding, ambulating, tolerating regular diet, hence d/maria eugenia home on POD2. Pt was failed TOLAC and required repeat  section for arrest of descent. Intraop course complicated by cervical extension repaired vaginally. Pt instructed to avoid labor/ctx in future pregnancies and to schedule c/s between 36-38w GA. Total EBL 2000cc, s/p 2U pRBC. Postop, H/H stable, however labs showed elevated creatinine and downtrending LFTs. In view of Cr, retroperitoneal sono performed- no evidence of hydronephrosis, left jet visualized. Cr stable, hence d/maria eugenia home as voiding without difficulty, tolerating regular diet, passing flatus. Plan for outpatient f/u in 1 week with repeat BMP at that time.

## 2020-04-22 NOTE — DISCHARGE NOTE OB - CARE PROVIDER_API CALL
Eleno Ga)  Obstetrics and Gynecology  [address] [place] [address]  Phone: [phone]  Fax: [phone]  Established Patient  Follow Up Time: 1 week

## 2020-04-22 NOTE — DISCHARGE NOTE OB - MEDICATION SUMMARY - MEDICATIONS TO TAKE
I will START or STAY ON the medications listed below when I get home from the hospital:    acetaminophen 325 mg oral tablet  -- 3 tab(s) by mouth   -- Indication: For pain    simethicone 80 mg oral tablet, chewable  -- 1 tab(s) by mouth every 4 hours, As Needed  -- Indication: For gas pain

## 2020-04-22 NOTE — DISCHARGE NOTE OB - CARE PLAN
Principal Discharge DX:	 delivery delivered  Goal:	healthy mother and baby  Assessment and plan of treatment:	Nothing in the vagina for 6 weeks (no sex, no tampons, no douching, no swimming pools). Avoid tub baths, you may shower.    If you have a fever of 100.4F or greater, severe vaginal bleeding, or severe abdominal pain, call your Ob/Gyn or come to the emergency department immediately.   Please keep incision clean and dry.

## 2020-04-22 NOTE — PROGRESS NOTE ADULT - SUBJECTIVE AND OBJECTIVE BOX
PGY 2 Post Partum note    Subjective: Pt seen and examined at bedside. Doing well, no complaints or events overnight, pain controlled. Pt has gonzalez in place, no flatus or BM, tolerating clear liquids, not yet OOB, breast feeding. Denies fever, chills, HA, blurry vision, CP, SOB, N/V, RUQ/epigastric pain, LE pain/swelling.    Physical exam:    Vital Signs Last 24 Hrs  T(F): 97.3 (22 Apr 2020 03:08), Max: 98.96 (21 Apr 2020 11:01)  HR: 64 (22 Apr 2020 03:08) (59 - 128)  BP: 83/53 (22 Apr 2020 03:08) (82/50 - 173/84)  RR: 18 (22 Apr 2020 03:08) (18 - 18)  SpO2: 97% (22 Apr 2020 03:08) (80% - 100%)    UO: (4690-5554) 900cc    Gen: NAD  CVS: s1s2, rrr  Lungs: ctab, no r/r/w  Abdomen: Soft, appropriately tender, no distension , firm uterine fundus below umbilicus, +BS, no RUQ/epigastric tenderness  Incision: tegaderm removed, c/d/i  : gonzalez draining clear  Pelvic: Normal lochia rubra noted  Ext: No calf tenderness or edema    Diet:  meds: acetaminophen   Tablet ..   975 milliGRAM(s) Oral (04-22-20 @ 02:39)    azithromycin  IVPB   255 mL/Hr IV Intermittent (04-21-20 @ 14:48)    bisacodyl   5 milliGRAM(s) Oral (04-21-20 @ 22:32)    ceFAZolin   IVPB   100 mL/Hr IV Intermittent (04-21-20 @ 14:48)    ceFAZolin   IVPB   100 mL/Hr IV Intermittent (04-22-20 @ 01:55)    citric acid/sodium citrate Solution   30 milliLiter(s) Oral (04-21-20 @ 14:50)    famotidine Injectable   20 milliGRAM(s) IV Push (04-21-20 @ 14:49)    ketorolac   Injectable   30 milliGRAM(s) IV Push (04-22-20 @ 06:29)   30 milliGRAM(s) IV Push (04-22-20 @ 01:55)    metoclopramide Injectable   10 milliGRAM(s) IV Push (04-21-20 @ 14:49)    simethicone   80 milliGRAM(s) Chew (04-22-20 @ 06:29)   80 milliGRAM(s) Chew (04-22-20 @ 02:39)   80 milliGRAM(s) Chew (04-21-20 @ 22:33)        LABS:                        11.7   14.17 )-----------( 106      ( 21 Apr 2020 17:20 )             33.8                         13.1   11.28 )-----------( 133      ( 20 Apr 2020 20:11 )             37.9

## 2020-04-22 NOTE — DISCHARGE NOTE OB - PLAN OF CARE
healthy mother and baby Nothing in the vagina for 6 weeks (no sex, no tampons, no douching, no swimming pools). Avoid tub baths, you may shower.    If you have a fever of 100.4F or greater, severe vaginal bleeding, or severe abdominal pain, call your Ob/Gyn or come to the emergency department immediately.   Please keep incision clean and dry.

## 2020-04-23 LAB
ALBUMIN SERPL ELPH-MCNC: 2.2 G/DL — LOW (ref 3.5–5.2)
ALBUMIN SERPL ELPH-MCNC: 2.3 G/DL — LOW (ref 3.5–5.2)
ALBUMIN SERPL ELPH-MCNC: 2.7 G/DL — LOW (ref 3.5–5.2)
ALP SERPL-CCNC: 115 U/L — SIGNIFICANT CHANGE UP (ref 30–115)
ALP SERPL-CCNC: 118 U/L — HIGH (ref 30–115)
ALP SERPL-CCNC: 150 U/L — HIGH (ref 30–115)
ALT FLD-CCNC: 10 U/L — SIGNIFICANT CHANGE UP (ref 0–41)
ALT FLD-CCNC: 13 U/L — SIGNIFICANT CHANGE UP (ref 0–41)
ALT FLD-CCNC: 8 U/L — SIGNIFICANT CHANGE UP (ref 0–41)
ANION GAP SERPL CALC-SCNC: 10 MMOL/L — SIGNIFICANT CHANGE UP (ref 7–14)
ANION GAP SERPL CALC-SCNC: 7 MMOL/L — SIGNIFICANT CHANGE UP (ref 7–14)
ANION GAP SERPL CALC-SCNC: 9 MMOL/L — SIGNIFICANT CHANGE UP (ref 7–14)
AST SERPL-CCNC: 40 U/L — SIGNIFICANT CHANGE UP (ref 0–41)
AST SERPL-CCNC: 42 U/L — HIGH (ref 0–41)
AST SERPL-CCNC: 49 U/L — HIGH (ref 0–41)
BASOPHILS # BLD AUTO: 0.01 K/UL — SIGNIFICANT CHANGE UP (ref 0–0.2)
BASOPHILS # BLD AUTO: 0.02 K/UL — SIGNIFICANT CHANGE UP (ref 0–0.2)
BASOPHILS # BLD AUTO: 0.02 K/UL — SIGNIFICANT CHANGE UP (ref 0–0.2)
BASOPHILS NFR BLD AUTO: 0.1 % — SIGNIFICANT CHANGE UP (ref 0–1)
BASOPHILS NFR BLD AUTO: 0.2 % — SIGNIFICANT CHANGE UP (ref 0–1)
BASOPHILS NFR BLD AUTO: 0.2 % — SIGNIFICANT CHANGE UP (ref 0–1)
BILIRUB SERPL-MCNC: <0.2 MG/DL — SIGNIFICANT CHANGE UP (ref 0.2–1.2)
BUN SERPL-MCNC: 15 MG/DL — SIGNIFICANT CHANGE UP (ref 10–20)
BUN SERPL-MCNC: 17 MG/DL — SIGNIFICANT CHANGE UP (ref 10–20)
BUN SERPL-MCNC: 21 MG/DL — HIGH (ref 10–20)
CALCIUM SERPL-MCNC: 7.9 MG/DL — LOW (ref 8.5–10.1)
CALCIUM SERPL-MCNC: 7.9 MG/DL — LOW (ref 8.5–10.1)
CALCIUM SERPL-MCNC: 8.6 MG/DL — SIGNIFICANT CHANGE UP (ref 8.5–10.1)
CHLORIDE SERPL-SCNC: 106 MMOL/L — SIGNIFICANT CHANGE UP (ref 98–110)
CHLORIDE SERPL-SCNC: 108 MMOL/L — SIGNIFICANT CHANGE UP (ref 98–110)
CHLORIDE SERPL-SCNC: 110 MMOL/L — SIGNIFICANT CHANGE UP (ref 98–110)
CO2 SERPL-SCNC: 23 MMOL/L — SIGNIFICANT CHANGE UP (ref 17–32)
CO2 SERPL-SCNC: 25 MMOL/L — SIGNIFICANT CHANGE UP (ref 17–32)
CO2 SERPL-SCNC: 26 MMOL/L — SIGNIFICANT CHANGE UP (ref 17–32)
CREAT ?TM UR-MCNC: 47 MG/DL — SIGNIFICANT CHANGE UP
CREAT SERPL-MCNC: 1.2 MG/DL — SIGNIFICANT CHANGE UP (ref 0.7–1.5)
CREAT SERPL-MCNC: 1.3 MG/DL — SIGNIFICANT CHANGE UP (ref 0.7–1.5)
CREAT SERPL-MCNC: 1.3 MG/DL — SIGNIFICANT CHANGE UP (ref 0.7–1.5)
EOSINOPHIL # BLD AUTO: 0.06 K/UL — SIGNIFICANT CHANGE UP (ref 0–0.7)
EOSINOPHIL # BLD AUTO: 0.09 K/UL — SIGNIFICANT CHANGE UP (ref 0–0.7)
EOSINOPHIL # BLD AUTO: 0.1 K/UL — SIGNIFICANT CHANGE UP (ref 0–0.7)
EOSINOPHIL NFR BLD AUTO: 0.5 % — SIGNIFICANT CHANGE UP (ref 0–8)
EOSINOPHIL NFR BLD AUTO: 0.8 % — SIGNIFICANT CHANGE UP (ref 0–8)
EOSINOPHIL NFR BLD AUTO: 0.8 % — SIGNIFICANT CHANGE UP (ref 0–8)
GLUCOSE SERPL-MCNC: 70 MG/DL — SIGNIFICANT CHANGE UP (ref 70–99)
GLUCOSE SERPL-MCNC: 75 MG/DL — SIGNIFICANT CHANGE UP (ref 70–99)
GLUCOSE SERPL-MCNC: 86 MG/DL — SIGNIFICANT CHANGE UP (ref 70–99)
HCT VFR BLD CALC: 28.4 % — LOW (ref 37–47)
HCT VFR BLD CALC: 28.7 % — LOW (ref 37–47)
HCT VFR BLD CALC: 31.9 % — LOW (ref 37–47)
HGB BLD-MCNC: 10.9 G/DL — LOW (ref 12–16)
HGB BLD-MCNC: 9.5 G/DL — LOW (ref 12–16)
HGB BLD-MCNC: 9.6 G/DL — LOW (ref 12–16)
IMM GRANULOCYTES NFR BLD AUTO: 0.6 % — HIGH (ref 0.1–0.3)
IMM GRANULOCYTES NFR BLD AUTO: 0.8 % — HIGH (ref 0.1–0.3)
IMM GRANULOCYTES NFR BLD AUTO: 1 % — HIGH (ref 0.1–0.3)
LDH SERPL L TO P-CCNC: 294 — HIGH (ref 50–242)
LYMPHOCYTES # BLD AUTO: 1.17 K/UL — LOW (ref 1.2–3.4)
LYMPHOCYTES # BLD AUTO: 1.42 K/UL — SIGNIFICANT CHANGE UP (ref 1.2–3.4)
LYMPHOCYTES # BLD AUTO: 1.59 K/UL — SIGNIFICANT CHANGE UP (ref 1.2–3.4)
LYMPHOCYTES # BLD AUTO: 10.3 % — LOW (ref 20.5–51.1)
LYMPHOCYTES # BLD AUTO: 12.2 % — LOW (ref 20.5–51.1)
LYMPHOCYTES # BLD AUTO: 12.2 % — LOW (ref 20.5–51.1)
MCHC RBC-ENTMCNC: 30.9 PG — SIGNIFICANT CHANGE UP (ref 27–31)
MCHC RBC-ENTMCNC: 31.3 PG — HIGH (ref 27–31)
MCHC RBC-ENTMCNC: 32.3 PG — HIGH (ref 27–31)
MCHC RBC-ENTMCNC: 33.4 G/DL — SIGNIFICANT CHANGE UP (ref 32–37)
MCHC RBC-ENTMCNC: 33.5 G/DL — SIGNIFICANT CHANGE UP (ref 32–37)
MCHC RBC-ENTMCNC: 34.2 G/DL — SIGNIFICANT CHANGE UP (ref 32–37)
MCV RBC AUTO: 92.3 FL — SIGNIFICANT CHANGE UP (ref 81–99)
MCV RBC AUTO: 93.4 FL — SIGNIFICANT CHANGE UP (ref 81–99)
MCV RBC AUTO: 94.7 FL — SIGNIFICANT CHANGE UP (ref 81–99)
MONOCYTES # BLD AUTO: 0.66 K/UL — HIGH (ref 0.1–0.6)
MONOCYTES # BLD AUTO: 0.68 K/UL — HIGH (ref 0.1–0.6)
MONOCYTES # BLD AUTO: 0.71 K/UL — HIGH (ref 0.1–0.6)
MONOCYTES NFR BLD AUTO: 5.2 % — SIGNIFICANT CHANGE UP (ref 1.7–9.3)
MONOCYTES NFR BLD AUTO: 5.8 % — SIGNIFICANT CHANGE UP (ref 1.7–9.3)
MONOCYTES NFR BLD AUTO: 6.1 % — SIGNIFICANT CHANGE UP (ref 1.7–9.3)
NEUTROPHILS # BLD AUTO: 10.58 K/UL — HIGH (ref 1.4–6.5)
NEUTROPHILS # BLD AUTO: 9.29 K/UL — HIGH (ref 1.4–6.5)
NEUTROPHILS # BLD AUTO: 9.37 K/UL — HIGH (ref 1.4–6.5)
NEUTROPHILS NFR BLD AUTO: 80.1 % — HIGH (ref 42.2–75.2)
NEUTROPHILS NFR BLD AUTO: 80.8 % — HIGH (ref 42.2–75.2)
NEUTROPHILS NFR BLD AUTO: 82.3 % — HIGH (ref 42.2–75.2)
NRBC # BLD: 0 /100 WBCS — SIGNIFICANT CHANGE UP (ref 0–0)
OSMOLALITY UR: 475 MOS/KG — SIGNIFICANT CHANGE UP (ref 50–1400)
PLATELET # BLD AUTO: 153 K/UL — SIGNIFICANT CHANGE UP (ref 130–400)
PLATELET # BLD AUTO: 91 K/UL — LOW (ref 130–400)
PLATELET # BLD AUTO: 95 K/UL — LOW (ref 130–400)
POTASSIUM SERPL-MCNC: 4.1 MMOL/L — SIGNIFICANT CHANGE UP (ref 3.5–5)
POTASSIUM SERPL-MCNC: 4.4 MMOL/L — SIGNIFICANT CHANGE UP (ref 3.5–5)
POTASSIUM SERPL-MCNC: 4.9 MMOL/L — SIGNIFICANT CHANGE UP (ref 3.5–5)
POTASSIUM SERPL-SCNC: 4.1 MMOL/L — SIGNIFICANT CHANGE UP (ref 3.5–5)
POTASSIUM SERPL-SCNC: 4.4 MMOL/L — SIGNIFICANT CHANGE UP (ref 3.5–5)
POTASSIUM SERPL-SCNC: 4.9 MMOL/L — SIGNIFICANT CHANGE UP (ref 3.5–5)
PROT SERPL-MCNC: 4 G/DL — LOW (ref 6–8)
PROT SERPL-MCNC: 4.1 G/DL — LOW (ref 6–8)
PROT SERPL-MCNC: 5.3 G/DL — LOW (ref 6–8)
RBC # BLD: 3.04 M/UL — LOW (ref 4.2–5.4)
RBC # BLD: 3.11 M/UL — LOW (ref 4.2–5.4)
RBC # BLD: 3.37 M/UL — LOW (ref 4.2–5.4)
RBC # FLD: 15.4 % — HIGH (ref 11.5–14.5)
RBC # FLD: 15.5 % — HIGH (ref 11.5–14.5)
RBC # FLD: 15.7 % — HIGH (ref 11.5–14.5)
SARS-COV-2 RNA SPEC QL NAA+PROBE: SIGNIFICANT CHANGE UP
SODIUM SERPL-SCNC: 140 MMOL/L — SIGNIFICANT CHANGE UP (ref 135–146)
SODIUM SERPL-SCNC: 142 MMOL/L — SIGNIFICANT CHANGE UP (ref 135–146)
SODIUM SERPL-SCNC: 142 MMOL/L — SIGNIFICANT CHANGE UP (ref 135–146)
SODIUM UR-SCNC: 141 MMOL/L — SIGNIFICANT CHANGE UP
URATE SERPL-MCNC: 4.7 MG/DL — SIGNIFICANT CHANGE UP (ref 2.5–7)
WBC # BLD: 11.38 K/UL — HIGH (ref 4.8–10.8)
WBC # BLD: 11.6 K/UL — HIGH (ref 4.8–10.8)
WBC # BLD: 13.08 K/UL — HIGH (ref 4.8–10.8)
WBC # FLD AUTO: 11.38 K/UL — HIGH (ref 4.8–10.8)
WBC # FLD AUTO: 11.6 K/UL — HIGH (ref 4.8–10.8)
WBC # FLD AUTO: 13.08 K/UL — HIGH (ref 4.8–10.8)

## 2020-04-23 PROCEDURE — 76770 US EXAM ABDO BACK WALL COMP: CPT | Mod: 26

## 2020-04-23 RX ORDER — SODIUM CHLORIDE 9 MG/ML
1000 INJECTION, SOLUTION INTRAVENOUS
Refills: 0 | Status: DISCONTINUED | OUTPATIENT
Start: 2020-04-23 | End: 2020-04-24

## 2020-04-23 RX ADMIN — Medication 975 MILLIGRAM(S): at 06:52

## 2020-04-23 RX ADMIN — Medication 975 MILLIGRAM(S): at 16:03

## 2020-04-23 RX ADMIN — SIMETHICONE 80 MILLIGRAM(S): 80 TABLET, CHEWABLE ORAL at 14:01

## 2020-04-23 RX ADMIN — Medication 5 MILLIGRAM(S): at 21:16

## 2020-04-23 RX ADMIN — Medication 975 MILLIGRAM(S): at 20:05

## 2020-04-23 RX ADMIN — SODIUM CHLORIDE 150 MILLILITER(S): 9 INJECTION, SOLUTION INTRAVENOUS at 04:01

## 2020-04-23 RX ADMIN — Medication 975 MILLIGRAM(S): at 13:58

## 2020-04-23 RX ADMIN — SIMETHICONE 80 MILLIGRAM(S): 80 TABLET, CHEWABLE ORAL at 21:16

## 2020-04-23 NOTE — PROGRESS NOTE ADULT - SUBJECTIVE AND OBJECTIVE BOX
Subjective:   Patient doing well. No complaints. Minimal lochia. Pain controlled.  Voiding freely    Objective:   T(F): 98.2 ( @ 11:12), Max: 98.2 ( @ 11:12)  HR: 83 ( @ 11:12)  BP: 110/70 ( @ 11:12) (86/52 - 110/70)  RR: 18 ( @ 11:12)  SpO2: --  Gen: AAOx3, NAD  Abd: Soft, Nontender, Nondistended, Fundus firm below the umbilicus, incision c/d/i  Ext: no tender, mild edema  Min Lochia Rubra    Labs:                        9.5    11.60 )-----------( 110      ( 2020 06:40 )             28.4       142  |  110  |  17  ----------------------------<  70  4.9   |  25  |  1.2    Ca    7.9<L>      2020 06:40    TPro  4.0<L>  /  Alb  2.2<L>  /  TBili  <0.2  /  DBili  x   /  AST  42<H>  /  ALT  10  /  AlkPhos  115          Tolerating regular diet  Passed flatus, passed bowel movement  Breast/Bottle feeding    Assessment:   29y s/p POD #2, repeat     Plan:  -Routine postpartum care  -Encouraged ambulation and PO hydration  -Tolerating regular diet

## 2020-04-23 NOTE — PROGRESS NOTE ADULT - ASSESSMENT
A/P: 28 yo now P2, s/p repeat  section w/ uterine rupture and cervical extension, POD2, EBL 2000cc, s/p 2U PRBCs, isolated elevated BPs, now with elevated Creatinine, recovering well.    - encourage ambulation, PO hydration  - f/u AM CBC, BMP, calculate FeNA   - IV fluids restarted at 150cc/hr   - monitor vitals, bleeding   - regular diet  - encourage incentive spirometry   - pain mgmt with tylenol/oxycodone, motrin d/maria eugenia   - simethicone/dulcolax   - routine postop care      Will inform attending A/P: 30 yo now P2, s/p repeat  section w/ uterine rupture and cervical extension, POD2, EBL 2000cc, s/p 2U PRBCs, isolated elevated BPs, now with elevated Creatinine and mildly elevated LFTs (downtrending), recovering well.    - encourage ambulation, PO hydration  - f/u AM CBC, BMP, calculate FeNA   - IV fluids restarted at 150cc/hr   - f/u COVID swab  - monitor vitals, bleeding   - regular diet  - encourage incentive spirometry   - pain mgmt with tylenol/oxycodone, motrin d/maria eugenia   - simethicone/dulcolax   - routine postop care      Will inform attending

## 2020-04-23 NOTE — PROGRESS NOTE ADULT - SUBJECTIVE AND OBJECTIVE BOX
ELY BAE  29y  Female    PGY3 Note:    Pt recovering well, no acute complaints. Denies headache, blurred vision, RUQ/epigastric pain, new onset swelling. Denies dizziness/headache, CP/SOB/palpitations    Ambulating: Yes  Voiding: Yes  Flatus: Yes  Bowel movements: Yes   Breast or bottle feeding: Breast feeding   Diet: Regular    MEDICATIONS  (STANDING):  acetaminophen   Tablet .. 975 milliGRAM(s) Oral <User Schedule>  acetaminophen  IVPB .. 1000 milliGRAM(s) IV Intermittent once  bisacodyl 5 milliGRAM(s) Oral at bedtime  lactated ringers. 1000 milliLiter(s) (150 mL/Hr) IV Continuous <Continuous>  measles/mumps/rubella Vaccine 0.5 milliLiter(s) SubCutaneous once  morphine PF Epidural 2 milliGRAM(s) Epidural once  simethicone 80 milliGRAM(s) Chew every 4 hours    MEDICATIONS  (PRN):  dexAMETHasone  Injectable 4 milliGRAM(s) IV Push every 6 hours PRN Nausea  diphenhydrAMINE 25 milliGRAM(s) Oral every 6 hours PRN Itching  glycerin Suppository - Adult 1 Suppository(s) Rectal at bedtime PRN Constipation  HYDROmorphone  Injectable 0.5 milliGRAM(s) IV Push every 10 minutes PRN Moderate Pain (4 - 6)  lanolin Ointment 1 Application(s) Topical every 6 hours PRN Sore Nipples  magnesium hydroxide Suspension 30 milliLiter(s) Oral two times a day PRN Constipation  naloxone Injectable 0.1 milliGRAM(s) IV Push every 3 minutes PRN For ANY of the following changes in patient status:  A. Breaths Per Minute LESS THAN 10, B. Oxygen saturation LESS THAN 90%, C. Sedation score of 6 for Stop After: 4 Times  ondansetron Injectable 4 milliGRAM(s) IV Push every 6 hours PRN Nausea  oxyCODONE    IR 5 milliGRAM(s) Oral every 3 hours PRN Moderate to Severe Pain (4-10)  oxyCODONE    IR 5 milliGRAM(s) Oral once PRN Moderate to Severe Pain (4-10)      PAST MEDICAL & SURGICAL HISTORY:  No pertinent past medical history   delivery delivered      Physical Exam  Vital Signs Last 24 Hrs  T(C): 35.7 (2020 03:57), Max: 36.9 (2020 13:25)  T(F): 96.2 (2020 03:57), Max: 98.5 (2020 13:25)  HR: 69 (2020 03:57) (69 - 97)  BP: 110/66 (2020 23:49) (86/52 - 110/66)  BP(mean): 71 (2020 03:57) (71 - 71)  RR: 18 (2020 03:57) (18 - 18)  SpO2: 98% (2020 13:25) (98% - 98%)    Gen: AAOx3, NAD  Heart: RRR, S1S2+  Lungs: CTA B/L, no r/r/w   Fundus: firm, below umbilicus   Wound: steris in place c/d/i   Abd: Soft, nontender, nondistended, BS+  Lochia: minimal   Ext: No calf tenderness, no swelling; 2+ DTRs UE B/L     Labs:                        9.6    11.38 )-----------( 100      ( 2020 01:00 )             28.7                         12.0   12.66 )-----------( 100      ( 2020 12:29 )             35.0

## 2020-04-24 VITALS
DIASTOLIC BLOOD PRESSURE: 54 MMHG | SYSTOLIC BLOOD PRESSURE: 97 MMHG | RESPIRATION RATE: 17 BRPM | TEMPERATURE: 99 F | HEART RATE: 63 BPM

## 2020-04-24 LAB
ANION GAP SERPL CALC-SCNC: 9 MMOL/L — SIGNIFICANT CHANGE UP (ref 7–14)
BUN SERPL-MCNC: 17 MG/DL — SIGNIFICANT CHANGE UP (ref 10–20)
CALCIUM SERPL-MCNC: 7.9 MG/DL — LOW (ref 8.5–10.1)
CHLORIDE SERPL-SCNC: 107 MMOL/L — SIGNIFICANT CHANGE UP (ref 98–110)
CO2 SERPL-SCNC: 25 MMOL/L — SIGNIFICANT CHANGE UP (ref 17–32)
CREAT SERPL-MCNC: 1.3 MG/DL — SIGNIFICANT CHANGE UP (ref 0.7–1.5)
GLUCOSE SERPL-MCNC: 94 MG/DL — SIGNIFICANT CHANGE UP (ref 70–99)
POTASSIUM SERPL-MCNC: 4.2 MMOL/L — SIGNIFICANT CHANGE UP (ref 3.5–5)
POTASSIUM SERPL-SCNC: 4.2 MMOL/L — SIGNIFICANT CHANGE UP (ref 3.5–5)
SODIUM SERPL-SCNC: 141 MMOL/L — SIGNIFICANT CHANGE UP (ref 135–146)
SURGICAL PATHOLOGY STUDY: SIGNIFICANT CHANGE UP

## 2020-04-24 RX ORDER — SIMETHICONE 80 MG/1
1 TABLET, CHEWABLE ORAL
Qty: 0 | Refills: 0 | DISCHARGE
Start: 2020-04-24

## 2020-04-24 RX ORDER — ACETAMINOPHEN 500 MG
3 TABLET ORAL
Qty: 0 | Refills: 0 | DISCHARGE
Start: 2020-04-24

## 2020-04-24 RX ADMIN — SIMETHICONE 80 MILLIGRAM(S): 80 TABLET, CHEWABLE ORAL at 10:03

## 2020-04-24 RX ADMIN — HYDROMORPHONE HYDROCHLORIDE 0.5 MILLIGRAM(S): 2 INJECTION INTRAMUSCULAR; INTRAVENOUS; SUBCUTANEOUS at 03:57

## 2020-04-24 RX ADMIN — Medication 975 MILLIGRAM(S): at 09:53

## 2020-04-24 RX ADMIN — SIMETHICONE 80 MILLIGRAM(S): 80 TABLET, CHEWABLE ORAL at 02:40

## 2020-04-24 RX ADMIN — SIMETHICONE 80 MILLIGRAM(S): 80 TABLET, CHEWABLE ORAL at 06:26

## 2020-04-24 NOTE — PROGRESS NOTE ADULT - ASSESSMENT
A/P: 28 yo now P2, s/p repeat  section w/ uterine rupture and cervical extension, POD#3, EBL 2000cc, s/p 2U PRBCs, isolated elevated BPs, now with elevated creatinine with normal renal ultrasound, recovering well    - encourage ambulation, PO hydration  - IV fluids  - monitor vitals, bleeding   - regular diet  - encourage incentive spirometry   - pain mgmt with tylenol/oxycodone, motrin d/maria eugenia   - simethicone/dulcolax   - routine postop care      Dr. Pereira and Dr. Ga to be made aware.

## 2020-04-24 NOTE — PROGRESS NOTE ADULT - ATTENDING COMMENTS
Pt seen sand examined no complaints feels well  landry po  ambulating  voiding  no complaints  recovering well   d/c home today with follow up 1 weeks for incison check and cbc/cmp
POD #1 s/p c/s for arrest of descent, s/p transfusion for 2 units - hbg stable   - d/c gonzalez  - encourgare ambulation  - dvt/gi ppx

## 2020-04-24 NOTE — PROGRESS NOTE ADULT - SUBJECTIVE AND OBJECTIVE BOX
PGY 1 Note:    Pt doing well, pain well controlled. No acute complaints. Denies fever, chills, N/V, CP, SOB, severe abdominal pain or heavy vaginal bleeding.    Ambulating: Yes  Voiding without difficulty  Flatus: Yes  Bowel movements: Yes  Breast or bottle feeding: Both  Diet: Regular    PAST MEDICAL & SURGICAL HISTORY:  No pertinent past medical history   delivery delivered      Physical Exam  Vital Signs Last 24 Hrs  T(F): 98.6 (2020 07:48), Max: 98.6 (2020 07:48)  HR: 63 (2020 07:48) (61 - 91)  BP: 97/54 (2020 07:48) (97/54 - 110/70)  RR: 17 (2020 07:48) (17 - 18)        Gen: AAOx3, NAD  Heart: S1S2, RRR  Lungs: CTABL  Abd: Soft, appropriately tender, mildly distended, BS+  Incision: Steri strips in place, incision clean/dry/intact, no erythema/induration/drainage.  Fundus: Firm, appropriately tender, below the umbilicus  Lochia: Minimal  Ext: No calf tenderness, no swelling    Labs:                        10.9   13.08 )-----------( 153      ( 2020 18:27 )             31.9                         9.5    11.60 )-----------( 110      ( 2020 06:40 )             28.4

## 2020-04-27 DIAGNOSIS — O34.211 MATERNAL CARE FOR LOW TRANSVERSE SCAR FROM PREVIOUS CESAREAN DELIVERY: ICD-10-CM

## 2020-04-27 DIAGNOSIS — O99.89 OTHER SPECIFIED DISEASES AND CONDITIONS COMPLICATING PREGNANCY, CHILDBIRTH AND THE PUERPERIUM: ICD-10-CM

## 2020-04-27 DIAGNOSIS — R03.0 ELEVATED BLOOD-PRESSURE READING, WITHOUT DIAGNOSIS OF HYPERTENSION: ICD-10-CM

## 2020-04-27 DIAGNOSIS — Z3A.42 42 WEEKS GESTATION OF PREGNANCY: ICD-10-CM

## 2020-04-27 DIAGNOSIS — N99.81 OTHER INTRAOPERATIVE COMPLICATIONS OF GENITOURINARY SYSTEM: ICD-10-CM

## 2020-04-27 DIAGNOSIS — R79.89 OTHER SPECIFIED ABNORMAL FINDINGS OF BLOOD CHEMISTRY: ICD-10-CM

## 2022-01-31 NOTE — PRE-ANESTHESIA EVALUATION ADULT - NSATTENDATTESTRD_GEN_ALL_CORE
[FreeTextEntry1] : PAF, s/p successful cardioversion to SR.\par \par Hypertension\par \par Gastroesophageal reflux disease.\par \par Obesity, s/p bariatric surgery (gastric sleeve, repair of paraesophageal hernia 10/24/17).\par \par Biliary tract cancer
The patient has been re-examined and I agree with the above assessment or I updated with my findings.

## 2022-03-17 NOTE — DISCHARGE NOTE OB - EAT A WELL BALANCED DIET INCLUDING PROTEINS (LEAN MEATS, POULTRY, FISH AND BEANS), FRESH FRUIT OR JUICE, FRESH VEGETABLES, AND DAIRY PRODUCTS
Alicia Alvarenga was seen and treated in our emergency department on 3/17/2022. She may return to work on 03/19/2022. If you have any questions or concerns, please don't hesitate to call.       Man Lira PA-C
Statement Selected

## 2022-07-12 NOTE — PRE-ANESTHESIA EVALUATION ADULT - [facility]_ENT_A_CORE
Central Line Insertion  Performed by: Shana Alexis MD  Authorized by: Shana Alexis MD     Date/Time: 7/12/2022 7:44 AM  Catheter Type:  triple lumen  Consent: Verbal consent obtained  Written consent obtained  Risks and benefits: risks, benefits and alternatives were discussed  Consent given by: patient  Patient understanding: patient states understanding of the procedure being performed  Patient consent: the patient's understanding of the procedure matches consent given  Procedure consent: procedure consent matches procedure scheduled  Required items: required blood products, implants, devices, and special equipment available  Patient identity confirmed: arm band, provided demographic data and verbally with patient  Indications: vascular access  Location details: right internal jugular  Catheter size: 7 Fr  Patient position: Trendelenburg  Assessment: blood return through all ports and free fluid flow  Preparation: skin prepped with 2% chlorhexidine and skin prepped with alcohol  Skin prep agent dried: skin prep agent completely dried prior to procedure  Sterile barriers: all five maximum sterile barriers used - cap, mask, sterile gown, sterile gloves, and large sterile sheet  Hand hygiene: hand hygiene performed prior to central venous catheter insertion  Ultrasound guidance: yes  sterile gel and probe cover used in ultrasound-guided central venous catheter insertionultrasound permanent image saved  Pre-procedure: landmarks identified  Number of attempts: 1  Successful placement: yes  Post-procedure: line sutured and dressing applied  Patient tolerance: Patient tolerated the procedure well with no immediate complications  Comments: Central line placed under sterile conditions with anatomic and ultrasound guidance  Venous access confirmed both with ultrasound and transduced column of blood  Column of blood was non-pulsatile and continuously drop to level of CVP  No
